# Patient Record
Sex: FEMALE | Race: BLACK OR AFRICAN AMERICAN | URBAN - METROPOLITAN AREA
[De-identification: names, ages, dates, MRNs, and addresses within clinical notes are randomized per-mention and may not be internally consistent; named-entity substitution may affect disease eponyms.]

---

## 2019-05-13 NOTE — PROGRESS NOTES
[address], [address] [place], [address] [place] [address] 
[address] [place] [address], [address] [place], [address] 
[phone] Elva Dues DO Patient ID: 
Name:  Jeremias Olvera MRN:  [id number] :  1955/63 y.o. Date:  2019 HISTORY OF PRESENT ILLNESS: 
Jeremias Olvera is a 61 y.o.   postmenopausal female referred by Dr. Radha Ospina for endometrial hyperplasia with atypia. Her endometrial biopsy from 2018 results were benign endocervical glands, mucus and inflammation. She has a history of  x 1 year with two insufficient office biopsies in 2019. She underwent a hysteroscopy, D/C and myosure for  bleeding on 2019 with pathology results of atypical glandula proliferation, suspicious for neoplasm. Her previous GYN surgical history positive for LSO; csection x 3. She is here today for evaluation. Still having some spotting. Labs: 
2019  
1) Uterus Endometrium curetting: 
Multiple fragments of smooth muscle and inactive endometrium with morphologic features suggestive of polyp. 2)Uterus, Endometrium, Curettings: 
Atypical glandula proliferation, suspicious for neoplasm. 2018 ENDOMETRIUM BIOPSY: 
 Fragments of benign endocervical glands, mucus and inflammation. Imaging 
none ROS:  
As above Patient Active Problem List  
 Diagnosis Date Noted  Obesity, morbid ([address] 2019 Past Medical History:  
Diagnosis Date  Diabetes ([address]  Edema  GERD (gastroesophageal reflux disease)  Hyperlipidemia  Hypothyroidism Past Surgical History:  
Procedure Laterality Date  DILATION AND CURETTAGE N/A 2019  HX  SECTION    
 X3  
 HX CHOLECYSTECTOMY  HX KNEE REPLACEMENT Bilateral   
 HX OOPHORECTOMY Left  HX OTHER SURGICAL Right   
 surgery to repair fractured arm  HX OVARIAN CYST REMOVAL    
 HX TUBAL LIGATION    
  HYSTEROSCOPY,W/ENDO BX N/A 2019 OB History   
4 Para 3 Term 3  AB 1 Living 3 SAB  
1  
 TAB Ectopic Molar Multiple Live Births  
0 Social History Tobacco Use  Smoking status: Never Smoker  Smokeless tobacco: Never Used Substance Use Topics  Alcohol use: Yes Comment: \"Occassional\" Family History Problem Relation Age of Onset  Diabetes Mother  Hypertension Mother Vazquez Other Mother Aneurysm  Hypertension Father  Other Paternal Grandmother Fibrocystic breast disease  Ovarian Cancer Paternal Grandmother Current Outpatient Medications Medication Sig  VICTOZA 3-RUSSELL 0.6 mg/0.1 mL (18 mg/3 mL) pnij INJECT 1.8 MG DAILY  levothyroxine (SYNTHROID) 150 mcg tablet TAKE 1 TABLET BY MOUTH DAILY  triamterene-hydroCHLOROthiazide (MAXZIDE) 75-50 mg per tablet TAKE 1 TABLET BY MOUTH EVERY DAY  LIVALO 2 mg tablet TAKE 1 TABLET BY MOUTH EVERY DAY  omeprazole (PRILOSEC) 40 mg capsule TAKE ONE CAPSULE BY MOUTH EVERY DAY  ibuprofen (MOTRIN) 800 mg tablet TAKE 1 TABLET (800 MG TOTAL) BY MOUTH EVERY 8 (EIGHT) HOURS AS NEEDED FOR MILD PAIN (PSR 1-3) (PAIN)  multivitamin with minerals (HAIR,SKIN AND NAILS PO) Take  by mouth. No current facility-administered medications for this visit. Allergies Allergen Reactions  Cephalexin Hives  Erythromycin Hives  Loratadine-Pseudoephedrine Itching  Sulfa (Sulfonamide Antibiotics) Hives OBJECTIVE: 
 
Physical Exam 
VITAL SIGNS: Visit Vitals /55 (BP 1 Location: Right arm, BP Patient Position: Sitting) Pulse 68 Temp 96.7 °F (35.9 °C) (Oral) Resp 16 Ht 5' 0.25\" (1.53 m) Wt 112.9 kg (249 lb) LMP  (LMP Unknown) SpO2 97% BMI 48.23 kg/m² GENERAL LAVELL: in no apparent distress and well developed and well nourished MUSCULOSKEL: no joint tenderness, deformity or swelling INTEGUMENT:  warm and dry, no rashes or lesions ABDOMEN . soft, NT, ND, No masses appreciated EXTREMITIES: extremities normal, atraumatic, no cyanosis or edema PELVIC: Vulva and vagina appear normal. Bimanual exam reveals normal uterus and adnexa. RECTAL: deferred KENROY SURVEY: Cervical, supraclavicular, axillary and inguinal nodes normal.  
NEURO: Grossly normal  
 
 
 
IMPRESSION/PLAN: 
1. Endometrial hyperplasia 
 -reviewed her pathology and discussed recommendation for hysterectomy, RSO witn intraop pathology and possible staging 
 -discussed robotic approach 
 -Risks, benefits and alternatives of surgery discussed in detail. Risks including bleeding, infection, blood clot, injury to nearby organs including bladder, bowel, ureters and blood vessels.  
 -surgery to be scheduled at [facility] on 5/23/2019 The total time spent was 60 minutes regarding this patients diagnosis of endometrial hyperplasia and >50% of this time was spent counseling and coordinating care Johana Horowitz DO Gynecologic Oncology 5/15/48485:67 PM

## 2019-05-14 ENCOUNTER — OFFICE VISIT (OUTPATIENT)
Dept: ONCOLOGY | Age: 64
End: 2019-05-14

## 2019-05-14 ENCOUNTER — TELEPHONE (OUTPATIENT)
Dept: ONCOLOGY | Age: 64
End: 2019-05-14

## 2019-05-14 VITALS
OXYGEN SATURATION: 97 % | SYSTOLIC BLOOD PRESSURE: 115 MMHG | TEMPERATURE: 96.7 F | HEIGHT: 60 IN | HEART RATE: 68 BPM | WEIGHT: 249 LBS | RESPIRATION RATE: 16 BRPM | DIASTOLIC BLOOD PRESSURE: 55 MMHG | BODY MASS INDEX: 48.88 KG/M2

## 2019-05-14 DIAGNOSIS — N85.00 ENDOMETRIAL HYPERPLASIA: ICD-10-CM

## 2019-05-14 DIAGNOSIS — Z01.818 PREOPERATIVE TESTING: Primary | ICD-10-CM

## 2019-05-14 DIAGNOSIS — E66.01 OBESITY, MORBID (HCC): ICD-10-CM

## 2019-05-14 DIAGNOSIS — Z01.818 PREOPERATIVE TESTING: ICD-10-CM

## 2019-05-14 RX ORDER — TRIAMTERENE AND HYDROCHLOROTHIAZIDE 75; 50 MG/1; MG/1
TABLET ORAL
Refills: 1 | COMMUNITY
Start: 2019-04-16

## 2019-05-14 RX ORDER — PITAVASTATIN CALCIUM 2.09 MG/1
TABLET, FILM COATED ORAL
Refills: 1 | COMMUNITY
Start: 2019-04-16

## 2019-05-14 RX ORDER — LIRAGLUTIDE 6 MG/ML
INJECTION SUBCUTANEOUS
Refills: 3 | COMMUNITY
Start: 2019-04-20

## 2019-05-14 RX ORDER — IBUPROFEN 800 MG/1
TABLET ORAL
Refills: 0 | COMMUNITY
Start: 2019-04-30 | End: 2019-06-10 | Stop reason: DRUGHIGH

## 2019-05-14 RX ORDER — LEVOTHYROXINE SODIUM 150 UG/1
TABLET ORAL
Refills: 1 | COMMUNITY
Start: 2019-04-17

## 2019-05-14 RX ORDER — OMEPRAZOLE 40 MG/1
CAPSULE, DELAYED RELEASE ORAL
Refills: 5 | COMMUNITY
Start: 2019-04-12

## 2019-05-14 NOTE — LETTER
5/14/19 Patient: Didier Richard YOB: 1955 Date of Visit: 5/14/2019 Ryan Stock [facility] A [address] [facility] [address] VIA Facsimile: [phone] Dear Ryan Stock MD, Thank you for referring Ms. Didier Richard to [facility] for evaluation. My notes for this consultation are attached. If you have questions, please do not hesitate to call me. I look forward to following your patient along with you. Sincerely, Munir Irene MD

## 2019-05-14 NOTE — PATIENT INSTRUCTIONS

## 2019-05-14 NOTE — PROGRESS NOTES
Mechelle Falcon, a 61 y.o. female,  is here for Chief Complaint Patient presents with  New Patient  
  referred by Dr. Shivani Coyle for endometrial hyperplasia Visit Vitals /55 (BP 1 Location: Right arm, BP Patient Position: Sitting) Pulse 68 Temp 96.7 °F (35.9 °C) (Oral) Resp 16 Ht 5' 0.25\" (1.53 m) Wt 112.9 kg (249 lb) SpO2 97% BMI 48.23 kg/m² Patient reports intermittent abdominal pain, \"I'm not sure if it's related to what's going on or just general pain. \" She is also experiencing vaginal spotting from the dilation and curettage that was performed on 5/2/2019. Denies experiencing any constipation or diarrhea. No burning, discomfort, or irritation with urination.

## 2019-05-14 NOTE — TELEPHONE ENCOUNTER
Patient had a few questions and concerns regarding surgery clearance. Patient stated she can be contacted on her cell.

## 2019-05-15 NOTE — TELEPHONE ENCOUNTER
Spoke with patient in regards to her preoperative testing. She stated that she had bloodwork and an EKG completed recently at [address] [place] [address] and wondered if we could use these results. I explained that since I don't have access to the official record, she would still have to have bloodwork and the EKG completed at [facility]. Patient was satisfied with this and had no further questions or concerns, encouraged to call back if she develops any.

## 2019-05-15 NOTE — PROGRESS NOTES
Reviewed consent form for robotic-assisted hysterectomy, RSO with intraop pathology and possible staging and information packet scheduled for 5/23/19 at [place] [address] with an arrival time of 1130. Patient was given information packet that included pre-op and post-op instructions as well as the scheduled date, start time, arrival time, and length of the surgery and signed the consent form. Patient expressed understanding and had no further questions. Advised to call with any questions or concerns.        Shahnaz ARANGO-BC

## 2019-05-16 ENCOUNTER — OFFICE VISIT (OUTPATIENT)
Dept: ONCOLOGY | Age: 64
End: 2019-05-16

## 2019-05-16 DIAGNOSIS — Z71.89 ENCOUNTER FOR SURGICAL COUNSELING: Primary | ICD-10-CM

## 2019-05-16 LAB
ALBUMIN SERPL-MCNC: 4.2 G/DL (ref 3.6–4.8)
ALBUMIN/GLOB SERPL: 1.4 {RATIO} (ref 1.2–2.2)
ALP SERPL-CCNC: 82 IU/L (ref 39–117)
ALT SERPL-CCNC: 31 IU/L (ref 0–32)
AST SERPL-CCNC: 23 IU/L (ref 0–40)
BILIRUB SERPL-MCNC: 0.3 MG/DL (ref 0–1.2)
BUN SERPL-MCNC: 19 MG/DL (ref 8–27)
BUN/CREAT SERPL: 28 (ref 12–28)
CALCIUM SERPL-MCNC: 9.6 MG/DL (ref 8.7–10.3)
CHLORIDE SERPL-SCNC: 105 MMOL/L (ref 96–106)
CO2 SERPL-SCNC: 26 MMOL/L (ref 20–29)
CREAT SERPL-MCNC: 0.69 MG/DL (ref 0.57–1)
EST. AVERAGE GLUCOSE BLD GHB EST-MCNC: 114 MG/DL
GLOBULIN SER CALC-MCNC: 3 G/DL (ref 1.5–4.5)
GLUCOSE SERPL-MCNC: 116 MG/DL (ref 65–99)
HBA1C MFR BLD: 5.6 % (ref 4.8–5.6)
POTASSIUM SERPL-SCNC: 4.7 MMOL/L (ref 3.5–5.2)
PROT SERPL-MCNC: 7.2 G/DL (ref 6–8.5)
SODIUM SERPL-SCNC: 145 MMOL/L (ref 134–144)
SPECIMEN STATUS REPORT, ROLRST: NORMAL

## 2019-06-04 ENCOUNTER — OFFICE VISIT (OUTPATIENT)
Dept: ONCOLOGY | Age: 64
End: 2019-06-04

## 2019-06-04 VITALS
HEART RATE: 75 BPM | RESPIRATION RATE: 16 BRPM | SYSTOLIC BLOOD PRESSURE: 138 MMHG | OXYGEN SATURATION: 100 % | DIASTOLIC BLOOD PRESSURE: 58 MMHG | TEMPERATURE: 96.1 F | WEIGHT: 246.6 LBS | BODY MASS INDEX: 48.42 KG/M2 | HEIGHT: 60 IN

## 2019-06-04 DIAGNOSIS — N85.00 ENDOMETRIAL HYPERPLASIA: Primary | ICD-10-CM

## 2019-06-04 NOTE — PROGRESS NOTES
Chaz Jaquez, a 59 y.o. female,  is here for   Chief Complaint   Patient presents with    Surgical Follow-up     2 weeks post op TLH, right salpingo-oophorectomy, and lysis of adhesions performed on 5/23/2019 @ [facility]       Visit Vitals  /58 (BP 1 Location: Left arm, BP Patient Position: Sitting)   Pulse 75   Temp 96.1 °F (35.6 °C) (Oral)   Resp 16   Ht 5' 0.25\" (1.53 m)   Wt 111.9 kg (246 lb 9.6 oz)   SpO2 100%   BMI 47.76 kg/m²       Patient reports intermittent small amount of vaginal spotting. Pain is being well controlled with 800 mg ibuprofen. She was educated about returning the Percocet prescription at her 4 week post-op, patient agreeable. Denies experiencing any constipation or diarrhea. No burning, discomfort, or irritation with urination. 1. Have you been to the ER, urgent care clinic since your last visit? Hospitalized since your last visit? No    2. Have you seen or consulted any other health care providers outside of the [address] [place] [address] since your last visit? Include any pap smears or colon screening.  No

## 2019-06-04 NOTE — LETTER
6/4/19 Patient: Mikhail Pickett YOB: 1955 Date of Visit: 6/4/2019 Maria M Lopez [facility] A [address] [place] [address] VIA Facsimile: [phone] Dear Maria M Lopez MD, Thank you for referring Ms. Mikhail Pickett [facility]. My notes for this consultation are attached. If you have questions, please do not hesitate to call me. I look forward to following your patient along with you. Sincerely, Yovanny Platt MD

## 2019-06-04 NOTE — PROGRESS NOTES
[address] [facility]  [address] [place] [address], [address], [address] [facility]  [address] [facility], [address] [place] [address]  [place], [address]   [phone]  [facility]      Postoperative Office Note  Patient ID:  Name: Yvonne Madison  MRM: [id number]  : 1955/64 y.o. Date: 2019    SUBJECTIVE:    This is a 59 y.o.  female who presents s/p Robotic TLH/RSO on 2019  Currently she has no problems with eating, bowel movements, voiding, or their wound  Appetite is good. Eating a regular diet without difficulty. Urinating without difficulty. Bowel movements are regular. The patient is not having any pain. .has had some spotting. Had some trouble with gas pains but that resolved    Her pathology revealed   UTERUS AND CERVIX, RIGHT FALLOPIAN TUBE AND RIGHT OVARY; TOTAL HYSTERECTOMY   AND RIGHT SALPINGO-OOPHORECTOMY:  - ENDOMETRIUM: ATYPICAL HYPERPLASIA/ENDOMETRIOID INTRAEPITHELIAL NEOPLASIA.  - SUBMUCOSAL LEIOMYOMA, 2.6 CM IN GREATEST DIMENSION. - CERVIX: NO EVIDENCE OF DYSPLASIA. - RIGHT OVARY: BENIGN ATROPHIC OVARY. - FALLOPIAN TUBE: UNREMARKABLE FIMBRIATED FALLOPIAN TUBE WITH PREVIOUS LIGATION.  - NO EVIDENCE OF INVASIVE CARCINOMA. Medications:     Current Outpatient Medications on File Prior to Visit   Medication Sig Dispense Refill    VICTOZA 3-RUSSELL 0.6 mg/0.1 mL (18 mg/3 mL) pnij INJECT 1.8 MG DAILY  3    levothyroxine (SYNTHROID) 150 mcg tablet TAKE 1 TABLET BY MOUTH DAILY  1    triamterene-hydroCHLOROthiazide (MAXZIDE) 75-50 mg per tablet TAKE 1 TABLET BY MOUTH EVERY DAY  1    LIVALO 2 mg tablet TAKE 1 TABLET BY MOUTH EVERY DAY  1    omeprazole (PRILOSEC) 40 mg capsule TAKE ONE CAPSULE BY MOUTH EVERY DAY  5    ibuprofen (MOTRIN) 800 mg tablet TAKE 1 TABLET (800 MG TOTAL) BY MOUTH EVERY 8 (EIGHT) HOURS AS NEEDED FOR MILD PAIN (PSR 1-3) (PAIN)  0    multivitamin with minerals (HAIR,SKIN AND NAILS PO) Take  by mouth. No current facility-administered medications on file prior to visit. Allergies: Allergies   Allergen Reactions    Cephalexin Hives    Erythromycin Hives    Loratadine-Pseudoephedrine Itching    Sulfa (Sulfonamide Antibiotics) Hives       OBJECTIVE:    Vitals:   Visit Vitals  /58 (BP 1 Location: Left arm, BP Patient Position: Sitting)   Pulse 75   Temp 96.1 °F (35.6 °C) (Oral)   Resp 16   Ht 5' 0.25\" (1.53 m)   Wt 111.9 kg (246 lb 9.6 oz)   LMP  (LMP Unknown)   SpO2 100%   BMI 47.76 kg/m²       Physical Examination:    General:  alert, cooperative, no distress   Abdomen: soft, bowel sounds active, non-tender   Incision: healing well   Pelvic: deferred   Rectal: not done   Extremity:   extremities normal, atraumatic, no cyanosis or edema     IMPRESSION/PLAN:    Fiona Bronson is Doing well postoperatively. .  She has a working diagnosis of Endometrial hyperplasia. The operative procedures and clinical results have been reviewed with the patient. Implications of diagnosis discussed at length. All questions answered. I will see the patient back in 2 week(s). The patient is advised to call our office with any problems or concerns.     Abilio Macdonald DO  Gynecologic Oncology  6/4/2019/1:27 PM

## 2019-06-04 NOTE — PATIENT INSTRUCTIONS
Learning About Endometrial Hyperplasia  What is endometrial hyperplasia? Endometrial hyperplasia means that the lining (endometrium) of the uterus becomes too thick. It usually causes abnormal uterine bleeding. It's more common in women who are close to or in menopause. The uterus is where a fetus grows during pregnancy. It's a pear-shaped organ in a woman's lower belly. Abnormal bleeding includes:  · Bleeding more than usual.  · Bleeding more often. · Bleeding that doesn't occur at your regular time. · Any bleeding after menopause. This condition can lead to cancer in some cases. Your doctor will keep an eye on the condition to see if the cells keep changing. He or she may suggest treatments such as surgery or hormones. What causes it? Hormones that are out of balance can cause problems with the lining of the uterus. If you have too much of the hormone estrogen compared to progesterone, the lining can start to thicken. This can affect your menstrual cycle. (A regular cycle usually helps keep the lining of the uterus thin.)  Your risk for this condition may be higher if you have:  · Polycystic ovary syndrome. · Obesity, diabetes, or late menopause. · Not given birth to any children. · Tamoxifen treatment for breast cancer. How is it diagnosed? If you have abnormal vaginal bleeding, your doctor may do some tests to check the lining of your uterus. You may have a transvaginal, or pelvic, ultrasound. This test uses sound waves to make a picture of the uterus. It can measure the thickness of the lining. You will also have a biopsy. Your doctor will take a small sample of the lining of the uterus and look at it under a microscope. The doctor will look for certain changes in the cells. How is it treated? Treatment for endometrial hyperplasia may include:  · Watching for changes in the lining of the uterus over time. · Taking the hormone progestin.   · Having surgery to remove the uterus (hysterectomy). In some cases, surgery to remove the ovaries and fallopian tubes may also be done. The treatment depends on whether the cells in the uterine lining have changed. It also depends on whether you want to have children in the future. Follow-up care is a key part of your treatment and safety. Be sure to make and go to all appointments, and call your doctor if you are having problems. It's also a good idea to know your test results and keep a list of the medicines you take. Where can you learn more? Go to http://hortencia-halley.info/. Enter Y647 in the search box to learn more about \"Learning About Endometrial Hyperplasia. \"  Current as of: May 14, 2018  Content Version: 11.9  © 6957-6662 [employer]. Care instructions adapted under license by [employer] (which disclaims liability or warranty for this information). If you have questions about a medical condition or this instruction, always ask your healthcare professional. [address] any warranty or liability for your use of this information.

## 2019-06-10 ENCOUNTER — TELEPHONE (OUTPATIENT)
Dept: ONCOLOGY | Age: 64
End: 2019-06-10

## 2019-06-10 RX ORDER — IBUPROFEN 800 MG/1
800 TABLET ORAL
Qty: 60 TAB | Refills: 1 | Status: SHIPPED | OUTPATIENT
Start: 2019-06-10

## 2019-06-10 NOTE — TELEPHONE ENCOUNTER
Patient contacted the office requesting a prescription for Motrin 800 mg be sent to the [facility] [address] in her chart. She states that she has Percocet but has not used it and will bring it to her appointment. Please call her back at [phone] with any questions.

## 2019-06-10 NOTE — TELEPHONE ENCOUNTER
Returned call to patient, reports intermittent discomfort/pain, not to the degree of needed narcotic pain relief. Will send Motrin rx and reviewed dosing recommendations. Patient denies symptoms otherwise. Verified f/u appt, advised to call sooner PRN. Patient agreeable.

## 2019-06-18 ENCOUNTER — OFFICE VISIT (OUTPATIENT)
Dept: ONCOLOGY | Age: 64
End: 2019-06-18

## 2019-06-18 VITALS
BODY MASS INDEX: 48.77 KG/M2 | RESPIRATION RATE: 16 BRPM | TEMPERATURE: 96.4 F | HEART RATE: 71 BPM | DIASTOLIC BLOOD PRESSURE: 57 MMHG | HEIGHT: 60 IN | OXYGEN SATURATION: 99 % | SYSTOLIC BLOOD PRESSURE: 130 MMHG | WEIGHT: 248.4 LBS

## 2019-06-18 DIAGNOSIS — N85.00 ENDOMETRIAL HYPERPLASIA: Primary | ICD-10-CM

## 2019-06-18 NOTE — PROGRESS NOTES
Yvonne Madison, a 59 y.o. female,  is here for   Chief Complaint   Patient presents with    Surgical Follow-up     4 week post op TLH/BSO performed on 5/23/2019       Visit Vitals  /57 (BP 1 Location: Right arm, BP Patient Position: Sitting)   Pulse 71   Temp 96.4 °F (35.8 °C) (Oral)   Resp 16   Ht 5' 0.25\" (1.53 m)   Wt 112.7 kg (248 lb 6.4 oz)   SpO2 99%   BMI 48.11 kg/m²       Patient reports intermittent vaginal spotting, \"just every once in awhile. \"  Patient denies any persistent or worsening abdominal or pelvic pain. Denies experiencing any constipation or diarrhea. No burning, discomfort, or irritation with urination. 1. Have you been to the ER, urgent care clinic since your last visit? Hospitalized since your last visit? No    2. Have you seen or consulted any other health care providers outside of the [address] [place] [address] since your last visit? Include any pap smears or colon screening.  No

## 2019-06-18 NOTE — PATIENT INSTRUCTIONS

## 2019-06-18 NOTE — PROGRESS NOTES
[address], [address], [address] [facility]  [address] [facility], [address] [place] [address]  [facility], [address] [place], [place] [address]in [address]   [phone]  [facility] DO      Postoperative Office Note  Patient ID:  Name: Vero Rain  MRM: [id number]  : 1955/64 y.o. Date: 2019    SUBJECTIVE:    This is a 59 y.o.  female who presents s/p Robotic TLH/RSO on 2019  Currently she has no problems with eating, bowel movements, voiding, or their wound  Appetite is good. Eating a regular diet without difficulty. Urinating without difficulty. Bowel movements are regular. The patient is not having any pain. .has had some spotting. Her pathology revealed   UTERUS AND CERVIX, RIGHT FALLOPIAN TUBE AND RIGHT OVARY; TOTAL HYSTERECTOMY   AND RIGHT SALPINGO-OOPHORECTOMY:  - ENDOMETRIUM: ATYPICAL HYPERPLASIA/ENDOMETRIOID INTRAEPITHELIAL NEOPLASIA.  - SUBMUCOSAL LEIOMYOMA, 2.6 CM IN GREATEST DIMENSION. - CERVIX: NO EVIDENCE OF DYSPLASIA. - RIGHT OVARY: BENIGN ATROPHIC OVARY. - FALLOPIAN TUBE: UNREMARKABLE FIMBRIATED FALLOPIAN TUBE WITH PREVIOUS LIGATION.  - NO EVIDENCE OF INVASIVE CARCINOMA. Medications:     Current Outpatient Medications on File Prior to Visit   Medication Sig Dispense Refill    ibuprofen (MOTRIN) 800 mg tablet Take 1 Tab by mouth every six (6) hours as needed for Pain. 60 Tab 1    VICTOZA 3-RUSSELL 0.6 mg/0.1 mL (18 mg/3 mL) pnij INJECT 1.8 MG DAILY  3    levothyroxine (SYNTHROID) 150 mcg tablet TAKE 1 TABLET BY MOUTH DAILY  1    triamterene-hydroCHLOROthiazide (MAXZIDE) 75-50 mg per tablet TAKE 1 TABLET BY MOUTH EVERY DAY  1    LIVALO 2 mg tablet TAKE 1 TABLET BY MOUTH EVERY DAY  1    omeprazole (PRILOSEC) 40 mg capsule TAKE ONE CAPSULE BY MOUTH EVERY DAY  5    multivitamin with minerals (HAIR,SKIN AND NAILS PO) Take  by mouth. No current facility-administered medications on file prior to visit. Allergies: Allergies   Allergen Reactions    Cephalexin Hives    Erythromycin Hives    Loratadine-Pseudoephedrine Itching    Sulfa (Sulfonamide Antibiotics) Hives       OBJECTIVE:    Vitals:   Visit Vitals  LMP  (LMP Unknown)       Physical Examination:    General:  alert, cooperative, no distress   Abdomen: soft, bowel sounds active, non-tender   Incision: healing well   Pelvic: NEFG, spec exam revealed vaginal cuff intact. BME revealed cuff intact, NT   Rectal: not done   Extremity:   extremities normal, atraumatic, no cyanosis or edema     IMPRESSION/PLAN:    Madeleine Tran is Doing well postoperatively. .  She has a working diagnosis of Endometrial hyperplasia. The operative procedures and clinical results had been previously reviewed with the patient. Pt will f/u with dr. Ubaldo Rivera for routine gyn health maintenance  I will see the patient back prn The patient is advised to call our office with any problems or concerns.     Shirley Lambert DO  Gynecologic Oncology  6/18/2019/1:27 PM

## 2019-06-18 NOTE — LETTER
6/18/19 Patient: Esteban Hoffman YOB: 1955 Date [facility]: [facility] [address] [facility] [address] VIA Facsimile: [phone] Dear Nils Schaffer MD, Thank you for referring Ms. Esteban Hoffman to [facility] for evaluation. My notes for this consultation are attached. If you have questions, please do not hesitate to call me. I look forward to following your patient along with you. Sincerely, Robert Daley MD

## 2019-08-20 DIAGNOSIS — N85.00 ENDOMETRIAL HYPERPLASIA: ICD-10-CM

## 2019-08-20 DIAGNOSIS — Z01.818 PREOPERATIVE TESTING: ICD-10-CM

## 2022-03-18 PROBLEM — E66.01 OBESITY, MORBID (HCC): Status: ACTIVE | Noted: 2019-05-14

## 2023-01-09 ENCOUNTER — OFFICE VISIT (OUTPATIENT)
Dept: HEMATOLOGY | Age: 68
End: 2023-01-09

## 2023-01-09 ENCOUNTER — HOSPITAL ENCOUNTER (OUTPATIENT)
Dept: LAB | Age: 68
Discharge: HOME | End: 2023-01-09
Payer: MEDICARE

## 2023-01-09 VITALS
HEIGHT: 60 IN | WEIGHT: 255 LBS | BODY MASS INDEX: 50.06 KG/M2 | HEART RATE: 61 BPM | TEMPERATURE: 97.1 F | OXYGEN SATURATION: 99 %

## 2023-01-09 DIAGNOSIS — R74.8 ELEVATED LIVER ENZYMES: Primary | ICD-10-CM

## 2023-01-09 DIAGNOSIS — R74.8 ELEVATED LIVER ENZYMES: ICD-10-CM

## 2023-01-09 DIAGNOSIS — R94.5 ABNORMAL RESULTS OF LIVER FUNCTION STUDIES: ICD-10-CM

## 2023-01-09 PROBLEM — Z98.890 H/O ELBOW SURGERY: Status: ACTIVE | Noted: 2023-01-09

## 2023-01-09 PROBLEM — K21.9 GERD (GASTROESOPHAGEAL REFLUX DISEASE): Status: ACTIVE | Noted: 2023-01-09

## 2023-01-09 PROBLEM — E11.9 TYPE II DIABETES MELLITUS (HCC): Status: ACTIVE | Noted: 2023-01-09

## 2023-01-09 PROBLEM — I10 HYPERTENSION: Status: ACTIVE | Noted: 2023-01-09

## 2023-01-09 PROBLEM — Z90.710 S/P TAH (TOTAL ABDOMINAL HYSTERECTOMY): Status: ACTIVE | Noted: 2023-01-09

## 2023-01-09 PROBLEM — Z96.653 HISTORY OF TOTAL BILATERAL KNEE REPLACEMENT: Status: ACTIVE | Noted: 2023-01-09

## 2023-01-09 PROBLEM — Z90.49 HISTORY OF CHOLECYSTECTOMY: Status: ACTIVE | Noted: 2023-01-09

## 2023-01-09 PROBLEM — E78.00 HYPERCHOLESTEROLEMIA: Status: ACTIVE | Noted: 2023-01-09

## 2023-01-09 LAB
ALBUMIN SERPL-MCNC: 3.4 G/DL (ref 3.4–5)
ALBUMIN/GLOB SERPL: 0.9 (ref 0.8–1.7)
ALP SERPL-CCNC: 90 U/L (ref 45–117)
ALT SERPL-CCNC: 39 U/L (ref 13–56)
ANION GAP SERPL CALC-SCNC: 6 MMOL/L (ref 3–18)
AST SERPL-CCNC: 39 U/L (ref 10–38)
BASOPHILS # BLD: 0 K/UL (ref 0–0.1)
BASOPHILS NFR BLD: 1 % (ref 0–2)
BILIRUB DIRECT SERPL-MCNC: 0.2 MG/DL (ref 0–0.2)
BILIRUB SERPL-MCNC: 0.6 MG/DL (ref 0.2–1)
BUN SERPL-MCNC: 19 MG/DL (ref 7–18)
BUN/CREAT SERPL: 21 (ref 12–20)
CALCIUM SERPL-MCNC: 8.9 MG/DL (ref 8.5–10.1)
CHLORIDE SERPL-SCNC: 107 MMOL/L (ref 100–111)
CO2 SERPL-SCNC: 27 MMOL/L (ref 21–32)
CREAT SERPL-MCNC: 0.9 MG/DL (ref 0.6–1.3)
DIFFERENTIAL METHOD BLD: ABNORMAL
EOSINOPHIL # BLD: 0.1 K/UL (ref 0–0.4)
EOSINOPHIL NFR BLD: 2 % (ref 0–5)
ERYTHROCYTE [DISTWIDTH] IN BLOOD BY AUTOMATED COUNT: 13.4 % (ref 11.6–14.5)
GLOBULIN SER CALC-MCNC: 3.8 G/DL (ref 2–4)
GLUCOSE SERPL-MCNC: 120 MG/DL (ref 74–99)
HCT VFR BLD AUTO: 41 % (ref 35–45)
HGB BLD-MCNC: 13.9 G/DL (ref 12–16)
IMM GRANULOCYTES # BLD AUTO: 0 K/UL (ref 0–0.04)
IMM GRANULOCYTES NFR BLD AUTO: 0 % (ref 0–0.5)
INR PPP: 1 (ref 0.8–1.2)
LYMPHOCYTES # BLD: 2.2 K/UL (ref 0.9–3.6)
LYMPHOCYTES NFR BLD: 38 % (ref 21–52)
MCH RBC QN AUTO: 31.4 PG (ref 24–34)
MCHC RBC AUTO-ENTMCNC: 33.9 G/DL (ref 31–37)
MCV RBC AUTO: 92.8 FL (ref 78–100)
MONOCYTES # BLD: 0.4 K/UL (ref 0.05–1.2)
MONOCYTES NFR BLD: 7 % (ref 3–10)
NEUTS SEG # BLD: 3 K/UL (ref 1.8–8)
NEUTS SEG NFR BLD: 52 % (ref 40–73)
NRBC # BLD: 0 K/UL (ref 0–0.01)
NRBC BLD-RTO: 0 PER 100 WBC
PLATELET # BLD AUTO: 109 K/UL (ref 135–420)
PMV BLD AUTO: 12.1 FL (ref 9.2–11.8)
POTASSIUM SERPL-SCNC: 3.6 MMOL/L (ref 3.5–5.5)
PROT SERPL-MCNC: 7.2 G/DL (ref 6.4–8.2)
PROTHROMBIN TIME: 14 SEC (ref 11.5–15.2)
RBC # BLD AUTO: 4.42 M/UL (ref 4.2–5.3)
SODIUM SERPL-SCNC: 140 MMOL/L (ref 136–145)
WBC # BLD AUTO: 5.7 K/UL (ref 4.6–13.2)

## 2023-01-09 PROCEDURE — 36415 COLL VENOUS BLD VENIPUNCTURE: CPT

## 2023-01-09 PROCEDURE — 85025 COMPLETE CBC W/AUTO DIFF WBC: CPT

## 2023-01-09 PROCEDURE — 80076 HEPATIC FUNCTION PANEL: CPT

## 2023-01-09 PROCEDURE — 85610 PROTHROMBIN TIME: CPT

## 2023-01-09 PROCEDURE — 80048 BASIC METABOLIC PNL TOTAL CA: CPT

## 2023-01-09 PROCEDURE — 82107 ALPHA-FETOPROTEIN L3: CPT

## 2023-01-09 PROCEDURE — 86708 HEPATITIS A ANTIBODY: CPT

## 2023-01-09 RX ORDER — TRIAMCINOLONE ACETONIDE 0.25 MG/G
OINTMENT TOPICAL 2 TIMES DAILY
COMMUNITY

## 2023-01-09 RX ORDER — PRAVASTATIN SODIUM 20 MG/1
20 TABLET ORAL DAILY
COMMUNITY
Start: 2022-11-20

## 2023-01-09 RX ORDER — MELOXICAM 15 MG/1
15 TABLET ORAL DAILY
COMMUNITY
Start: 2022-08-11 | End: 2023-08-11

## 2023-01-09 RX ORDER — CLOBETASOL PROPIONATE 0.5 MG/G
CREAM TOPICAL 2 TIMES DAILY
COMMUNITY

## 2023-01-09 NOTE — Clinical Note
1/22/2023    Patient: Dinora Tai   YOB: 1955   Date of Visit: 1/9/2023     Mir Mccullough, [address] [address]  Via Fax: [phone]    Dear Mir Mccullough MD,      Thank you for referring Ms. Dinora Tai to [address] [place] [address],[address] for evaluation. My notes for this consultation are attached. If you have questions, please do not hesitate to call me. I look forward to following your patient along with you.       Sincerely,    Hunter Kelly MD

## 2023-01-09 NOTE — PROGRESS NOTES
[address] [facility], MD, FACP, [facility] [place]      Junito Reynoso, PA-AMIRA Peñaloza, PCNP-BC   Kerrie Dobbs, [facility]-   Gaila Prader, FNP-C  Rachael Aguiar FNP-C   Gregory Klein, AGPCNP-BC      [address]   at [address], [address]   [phone]   FAX: [address]   at [facility]   [address], [address]   [facility], [address]   [phone]   FAX: [phone]       Patient Care Team:  Anastasiia Glez MD as PCP - General (Family Medicine)  Darcy Randolph MD (Obstetrics & Gynecology)  Dg Bee MD (Gynecologic Oncology)      Problem List  Date Reviewed: 1/9/2023            Codes Class Noted    Elevated liver enzymes ICD-10-CM: R74.8  ICD-9-CM: 790.5  1/9/2023        Type II diabetes mellitus ([facility] [address] ICD-10-CM: E11.9  ICD-9-CM: 250.00  1/9/2023        Hypercholesterolemia ICD-10-CM: E78.00  ICD-9-CM: 272.0  1/9/2023        GERD (gastroesophageal reflux disease) ICD-10-CM: K21.9  ICD-9-CM: 530.81  1/9/2023        Hypertension ICD-10-CM: I10  ICD-9-CM: 401.9  1/9/2023        S/P CLARA (total abdominal hysterectomy) ICD-10-CM: Z90.710  ICD-9-CM: V88.01  1/9/2023        History of total bilateral knee replacement ICD-10-CM: R01.728  ICD-9-CM: V43.65  1/9/2023        H/O elbow surgery ICD-10-CM: Z98.890  ICD-9-CM: V15.29  1/9/2023        History of cholecystectomy ICD-10-CM: Z90.49  ICD-9-CM: V45.79  1/9/2023        Obesity, morbid [facility]) ICD-10-CM: E66.01  ICD-9-CM: 278.01  5/14/2019           The clinicians listed above have asked me to see [facility] in consultation regarding elevated liver enzymes and its management. All medical records sent by the referring physicians were reviewed including imaging studies     The patient is a 79 y.o.  Black female who was found to have elevated liver transaminases in 2022. Serologic evaluation for markers of chronic liver disease was positive for SHARRI,     The most recent imaging of the liver was Ultrasound performed in 4/2022. Results suggest chronic liver disease with a nodular surface suggesting cirrhosis. Elastography mild  FS.  283, 19.3, 20%    The patient has the following symptoms which could be attributed to the liver disorder:    diffuse abdominal pain,   nausea,   Alternating constipation and diarrhea,     The patient is not experiencing the following symptoms which are commonly seen in this liver disorder:   fatigue,   vomiting,     The patient completes all daily activities without any functional limitations. ASSESSMENT AND PLAN:  Elevated liver enzymes  Persistent elevation in  Intermittent elevation in  liver transaminases   and   alkaline phosphatase   of unclear etiology at this time. Will perform   Have performed   laboratory testing to monitor liver function and degree of liver injury. This included   BMP,   hepatic panel,   CBC with platelet count,   INR. Liver transaminases are   normal.    elevated. AST is   normal.    elevated. ALT is   normal.    elevated. ALP is   normal.    elevated. Liver function is   normal.    depressed. Total bilirubin is   normal.    elevated. Serum albumin is   normal.    depressed. INR is   normal.    prolonged. The platelet count is   normal.    depressed. Based upon laboratory studies   Fibroscan,   Elastography,   and imaging    the patient   does not appear to have   appears to have   may have   significant liver injury. advanced liver disease. cirrhosis. Serologic testing for causes of chronic liver disease   was ordered.     was negative for     was positive for   HCV  HBV   SHARRI   ASMA  AMA  Alpha-1-antitrypsin  Ceruloplasmin  an elevation in   Ferritin  FE saturation  Will perform additional serologic tests to screen for other causes of chronic liver disease. The most likely causes for the liver chemistry abnormalities were discussed with the patient and include   viral hepatitis,   fatty liver disease,   alcoholic liver disease. immune liver disorders,   genetic metabolic liver disorders,   medications,   a non-specific non-hepatitis virus. The need to perform an assessment of liver fibrosis was discussed with the patient. The Fibroscan can assess liver fibrosis and determine if a patient has advanced fibrosis or cirrhosis without the need for liver biopsy. This will be performed at the next office visit. If the Fibroscan suggests advanced fibrosis then a liver biopsy should be considered. The Fibroscan can be repeated annually or as often as clinically indicated to assess for fibrosis progression and/or regression. If the   Since the  Fibroscan suggests there is none or minimal liver injury the patient will be monitored at 6 month intervals. If the liver enzymes remain normal and the liver stiffness by Fibroscan remains normal or near normal over the next 1-2 years than no further monitoring is needed. If the liver enzymes remain   intermittently elevated or become persistently elevated   persistently elevated   and/or the Fibroscan suggests that liver stiffness is increasing over the next 1-2 years than a liver biopsy should be considered. Will perform   Have performed   laboratory testing to monitor liver function and degree of liver injury. This included   BMP,   hepatic panel,   CBC with platelet count,   INR. Will perform imaging of the liver with   ultrasound. triple phase CT scan. dynamic MRI. MRI and MRCP because of persistent elevation in ALP and possible bile duct disease. The need to perform a liver biopsy to help determine the cause and severity of the liver test abnormalities was discussed.   The risks of performing the liver biopsy including pain, puncture of the lung, gallbladder, intestine or kidney and bleeding were discussed. The patient has decided to have a liver biopsy. This will be scheduled. Will defer liver biopsy for now. I have recommended that we proceed with liver biopsy but the patient has decided to defer this for now. There is no reason to perform liver biopsy at this time. Liver chemistries will be monitored. If the liver enzymes remain persistently elevated over the next 1-2 years a liver biopsy should be performed to ensure there is no ongoing chronic liver disease. The patient had a liver biopsy performed in ***. Will request that the liver biopsy slides sent be to me for my personal review. Screening for Hepatocellular Carcinoma  HCC screening   is not necessary if the patient has no evidence of cirrhosis. has not been not been performed   since ***/***.  was performed in ***/*** and   does not suggest [facility] [address] demonstrates   an elevation in AFP. a lesion on ultrasound. AFP was ordered today and ultrasound will be scheduled. Will repeat ultrasound in 6 months. Will perform   triple phase CT scan      dynamic MRI   to further characterize the lesion and help determine if this is HCC. Treatment of other medical problems in patients with chronic liver disease  There are no contraindications for the patient to take most medications that are necessary for treatment of other medical issues. The patient has cirrhrosis and should avoid taking NSAIDs which are associated with a higher rate of developing KATHRYN. The patient had HE and should avoid taking Benzodiazapines which could exacerbate HE. The patient can take   any medications utilized for treatment of DM  statins to treat hypercholesterolemia    The patient has alcohol induced liver disease but has been abstinent from alcohol for greater than 6 months.   Normal doses of acetaminophen, as recommended on the label of the bottle, are not hepatotoxic except in the setting of daily alcohol use, even in patients with cirrhosis and can be utilized for pain. The patient consumes alcohol on a daily basis or has recently stopped consuming alcohol. Regular alcohol use increases the risk of toxicity from acetaminophen. This analgesic should be avoided until the patient has been abstinent from alcohol for 6 months. Counseling for alcohol in patients with chronic liver disease  The patient was counseled regarding alcohol consumption and the effect of alcohol on chronic liver disease. The patient has cirrhosis and was advised to be abstinent from all alcohol including non-alcoholic beer which does contain some alcohol. The patient does not consume any significant amount of alcohol. The patient has not consumed alcohol since ***. The patient has continued to consume alcohol   daily. on rare social occasions. The patient was reminded that alcohol can cause fatty liver. Patients who have undergone obesity surgery are at much greater risk to develop alcohol induced liver injury. The patient does not have a chronic liver disease and does not have to be abstinent from alcohol. The patient consumes too much alcohol and is at risk to develop alcohol induced liver liver injury. It was recommended that all alcohol consumption be stopped and the patient be abstinent from alcohol for at least *** months. If the patient cannot stop consuming alcohol then there is an aclohol use disorder and the patient should consider entering alcohol counseling and/or attending AA. The patient has an alcohol abuse disorder and it was suggested that they enter alcohol counseling and/or attend AA. If the patient cannot stop drinking alcohol they cannot be considered a candidate for liver transplant. The patient will need to attend alcohol counceling prior to being accepted as a liver transplant candidate.     Vaccinations Vaccination for viral hepatitis A and B is recommended since the patient has no serologic evidence of previous exposure or vaccination with immunity. Vaccination for viral hepatitis A and B is not needed. The patient has serologic evidence of prior exposure or vaccination with immunity. Vaccination for viral hepatitis A and B has been initiated. Vaccination for viral hepatitis A and B has been completed. Serologic studies will be performed to assess response to vaccine. The need for vaccination against viral hepatitis A and B will be assessed with serologic and instituted as appropriate. Since the patient does not have a chronic liver disease which can lead to liver injury screening for HAV and HBV is not needed. The patient has   not   received   2 doses   and the booster dose   of COVID-19 vaccine. The patient should receive a booster dose of COVID-19 vaccine in ***/***. The patient had COVID-19 infection and recovered. The patient does not want to take COVID-19 vaccine. The patient was encouraged to take the COVID-19 vaccine. Routine vaccinations against other bacterial and viral agents can be performed as indicated. Annual flu vaccination should be administered if indicated. ALLERGIES  Allergies   Allergen Reactions    Cephalexin Hives    Erythromycin Hives    Loratadine-Pseudoephedrine Itching    Sulfa (Sulfonamide Antibiotics) Hives       MEDICATIONS  Current Outpatient Medications   Medication Sig    meloxicam (MOBIC) 15 mg tablet Take 15 mg by mouth daily. pravastatin (PRAVACHOL) 20 mg tablet Take 20 mg by mouth daily. clobetasoL (Temovate) 0.05 % topical cream Apply  to affected area two (2) times a day. triamcinolone acetonide (KENALOG) 0.025 % ointment Apply  to affected area two (2) times a day. use thin layer    VICTOZA 3-RUSSELL 0.6 mg/0.1 mL (18 mg/3 mL) pnij INJECT 1.8 MG DAILY    levothyroxine (SYNTHROID) 150 mcg tablet 88 mcg. triamterene-hydroCHLOROthiazide (MAXZIDE) 75-50 mg per tablet TAKE 1 TABLET BY MOUTH EVERY DAY     No current facility-administered medications for this visit. SYSTEM REVIEW NOT RELATED TO LIVER DISEASE OR REVIEWED ABOVE:  Constitution systems: Negative for fever, chills, weight gain, weight loss. Eyes: Negative for visual changes. ENT: Negative for sore throat, painful swallowing. Respiratory: Negative for cough, hemoptysis, SOB. Cardiology: Negative for chest pain, palpitations. GI:  Negative for constipation or diarrhea. : Negative for urinary frequency, dysuria, hematuria, nocturia. Skin: Negative for rash. Hematology: Negative for easy bruising, blood clots. Musculo-skelatal: Negative for back pain, muscle pain, weakness. Neurologic: Negative for headaches, dizziness, vertigo, memory problems not related to HE. Psychology: Negative for anxiety, depression. FAMILY HISTORY:  The father Has/had the following following chronic disease(s): None. The mother  of CVA. There is no family history of liver disease. SOCIAL HISTORY:  The patient is . The patient has 3 children, and 5 grandchildren. The patient has never used tobacco products. The patient has never consumed significant amounts of alcohol. The patient used to work as elementary and . The patient retired in 2017. PHYSICAL EXAMINATION:  Visit Vitals  Pulse 61   Temp 97.1 °F (36.2 °C)   Ht 5' 0.25\" (1.53 m)   Wt 255 lb (115.7 kg)   LMP  (LMP Unknown)   SpO2 99%   BMI 49.39 kg/m²     General: No acute distress. Eyes: Sclera anicteric. ENT: No oral lesions. Thyroid normal.  Nodes: No adenopathy. Skin: No spider angiomata. No jaundice. No palmar erythema. Respiratory: Lungs clear to auscultation. Cardiovascular: Regular heart rate. No murmurs. No JVD. Abdomen: Soft non-tender. Liver size normal to percussion/palpation. Spleen not palpable.  No obvious ascites. Extremities: No edema. No muscle wasting. No gross arthritic changes. Neurologic: Alert and oriented. Cranial nerves grossly intact. No asterixis. LABORATORY STUDIES:  Recent liver function panel, CBC with platelet count and BMP are not available. These studies will be performed. SEROLOGIES:  Not available or performed. Testing was performed today. LIVER HISTOLOGY:  Not available or performed    ENDOSCOPIC PROCEDURES:  Not available or performed    RADIOLOGY:  Not available or performed    OTHER TESTING:  Not available or performed    FOLLOW-UP:  All of the issues listed above in the Assessment and Plan were discussed with the patient. All questions were answered. The patient expressed a clear understanding of the above. [address] [id number] in ***   weeks   months   for Fibroscan   for elastography   2 weeks after liver biopsy. which should be 1-2 weeks after the next imaging study. and to initiate HCV treatment. to assess for the effects of diet changes and weight loss. to assess the effects and tolerability of ***.  for screening and enrollment into a clinical trial for treatment of ***. The patient was given a follow-up appointment for *** months in case she decides not to enter or is excluded from entering the clinical trial.  for routine monitoring. to review all data and determine the treatment plan.

## 2023-01-10 LAB — HAV AB SER QL IA: NEGATIVE

## 2023-01-11 LAB
AFP L3 MFR SERPL: 5.7 % (ref 0–9.9)
AFP SERPL-MCNC: 5.3 NG/ML (ref 0–9.2)

## 2023-01-25 ENCOUNTER — HOSPITAL ENCOUNTER (OUTPATIENT)
Dept: ULTRASOUND IMAGING | Age: 68
Discharge: HOME | End: 2023-01-25
Attending: INTERNAL MEDICINE
Payer: MEDICARE

## 2023-01-25 ENCOUNTER — HOSPITAL ENCOUNTER (OUTPATIENT)
Age: 68
Setting detail: OUTPATIENT SURGERY
Discharge: HOME | End: 2023-01-25
Attending: INTERNAL MEDICINE | Admitting: INTERNAL MEDICINE
Payer: MEDICARE

## 2023-01-25 VITALS
BODY MASS INDEX: 47.39 KG/M2 | DIASTOLIC BLOOD PRESSURE: 53 MMHG | HEART RATE: 72 BPM | TEMPERATURE: 97.7 F | WEIGHT: 251 LBS | SYSTOLIC BLOOD PRESSURE: 127 MMHG | HEIGHT: 61 IN | OXYGEN SATURATION: 99 % | RESPIRATION RATE: 16 BRPM

## 2023-01-25 DIAGNOSIS — R79.89 ELEVATED LFTS: ICD-10-CM

## 2023-01-25 PROCEDURE — 93005 ELECTROCARDIOGRAM TRACING: CPT

## 2023-01-25 RX ORDER — HYDROGEN PEROXIDE 3 %
20 SOLUTION, NON-ORAL MISCELLANEOUS DAILY
COMMUNITY
Start: 2022-12-08

## 2023-01-25 RX ORDER — ERGOCALCIFEROL 1.25 MG/1
1 CAPSULE ORAL
COMMUNITY
Start: 2022-12-15

## 2023-01-25 RX ORDER — CALCIUM CARBONATE/VITAMIN D3 500-10/5ML
1 LIQUID (ML) ORAL DAILY
COMMUNITY
Start: 2022-10-12

## 2023-01-25 RX ORDER — SODIUM CHLORIDE 9 MG/ML
125 INJECTION, SOLUTION INTRAVENOUS CONTINUOUS
Status: DISCONTINUED | OUTPATIENT
Start: 2023-01-25 | End: 2023-01-25 | Stop reason: HOSPADM

## 2023-01-25 NOTE — PERIOP NOTES
Patient bradycardiac, denies any shortness of breath, chest pain, dizziness or nausea.  Spoke with Dr. Varinder Mckeon, 12-lead EKG ordered

## 2023-01-25 NOTE — PERIOP NOTES
Called Dr. Rosette Matthews office to schedule an appointment of patient. Was directed to the NP Gene Rosado, gave her a report of the morning occurences including bradycardia, VS and instructions to patient and patient has copy of EKG. She stated she will assume responsibility and call the patient.

## 2023-01-25 NOTE — PERIOP NOTES
Dr. Shay Esparza in and spoke with patient.  Will cancel liver biopsy for today and patient to follow up with her cardiologist (Dr. Bladimir Champagne) and then will reschedule the liver biopsy

## 2023-01-25 NOTE — PERIOP NOTES
Discussed with patient that I will call Dr. Adria Rodas office and schedule her appointment so that we can discuss occurrence with them. Patient instructed to take it easy and to go to the ER if she has any chest pain, shortness of breath, or extreme dizziness. Patient and  verbalized understanding.

## 2023-01-26 LAB
ATRIAL RATE: 49 BPM
CALCULATED P AXIS, ECG09: 81 DEGREES
CALCULATED R AXIS, ECG10: -19 DEGREES
CALCULATED T AXIS, ECG11: 22 DEGREES
DIAGNOSIS, 93000: NORMAL
P-R INTERVAL, ECG05: 154 MS
Q-T INTERVAL, ECG07: 498 MS
QRS DURATION, ECG06: 94 MS
QTC CALCULATION (BEZET), ECG08: 449 MS
VENTRICULAR RATE, ECG03: 49 BPM

## 2023-04-22 DIAGNOSIS — R79.89 ELEVATED LFTS: Primary | ICD-10-CM

## 2024-01-24 ENCOUNTER — OFFICE VISIT (OUTPATIENT)
Age: 69
End: 2024-01-24
Payer: MEDICARE

## 2024-01-24 VITALS
SYSTOLIC BLOOD PRESSURE: 140 MMHG | TEMPERATURE: 97.3 F | BODY MASS INDEX: 42.86 KG/M2 | HEIGHT: 61 IN | WEIGHT: 227 LBS | HEART RATE: 66 BPM | DIASTOLIC BLOOD PRESSURE: 71 MMHG | OXYGEN SATURATION: 99 %

## 2024-01-24 DIAGNOSIS — K74.69 OTHER CIRRHOSIS OF LIVER (HCC): Primary | ICD-10-CM

## 2024-01-24 PROCEDURE — 1036F TOBACCO NON-USER: CPT | Performed by: INTERNAL MEDICINE

## 2024-01-24 PROCEDURE — 1090F PRES/ABSN URINE INCON ASSESS: CPT | Performed by: INTERNAL MEDICINE

## 2024-01-24 PROCEDURE — G8400 PT W/DXA NO RESULTS DOC: HCPCS | Performed by: INTERNAL MEDICINE

## 2024-01-24 PROCEDURE — 99214 OFFICE O/P EST MOD 30 MIN: CPT | Performed by: INTERNAL MEDICINE

## 2024-01-24 PROCEDURE — G8427 DOCREV CUR MEDS BY ELIG CLIN: HCPCS | Performed by: INTERNAL MEDICINE

## 2024-01-24 PROCEDURE — 1123F ACP DISCUSS/DSCN MKR DOCD: CPT | Performed by: INTERNAL MEDICINE

## 2024-01-24 PROCEDURE — G8484 FLU IMMUNIZE NO ADMIN: HCPCS | Performed by: INTERNAL MEDICINE

## 2024-01-24 PROCEDURE — 3017F COLORECTAL CA SCREEN DOC REV: CPT | Performed by: INTERNAL MEDICINE

## 2024-01-24 PROCEDURE — 3078F DIAST BP <80 MM HG: CPT | Performed by: INTERNAL MEDICINE

## 2024-01-24 PROCEDURE — 3077F SYST BP >= 140 MM HG: CPT | Performed by: INTERNAL MEDICINE

## 2024-01-24 PROCEDURE — G8417 CALC BMI ABV UP PARAM F/U: HCPCS | Performed by: INTERNAL MEDICINE

## 2024-01-24 RX ORDER — SEMAGLUTIDE 1.34 MG/ML
INJECTION, SOLUTION SUBCUTANEOUS
COMMUNITY
Start: 2023-06-15

## 2024-01-24 RX ORDER — BLOOD SUGAR DIAGNOSTIC
1 STRIP MISCELLANEOUS 2 TIMES DAILY
COMMUNITY
Start: 2022-07-07

## 2024-01-24 RX ORDER — POTASSIUM CHLORIDE 750 MG/1
10 TABLET, FILM COATED, EXTENDED RELEASE ORAL DAILY
COMMUNITY
Start: 2023-10-16

## 2024-01-24 RX ORDER — CLOBETASOL PROPIONATE 0.5 MG/G
CREAM TOPICAL
COMMUNITY
Start: 2020-10-12

## 2024-01-24 RX ORDER — CALCIUM CARBONATE/VITAMIN D3 500-10/5ML
1 LIQUID (ML) ORAL DAILY
COMMUNITY
Start: 2022-10-12

## 2024-01-24 RX ORDER — CHOLECALCIFEROL (VITAMIN D3) 125 MCG
CAPSULE ORAL
COMMUNITY
End: 2024-01-24

## 2024-01-24 RX ORDER — ATORVASTATIN CALCIUM 40 MG/1
40 TABLET, FILM COATED ORAL DAILY
COMMUNITY
Start: 2023-05-08 | End: 2024-05-07

## 2024-01-24 RX ORDER — OXYCODONE HYDROCHLORIDE AND ACETAMINOPHEN 5; 325 MG/1; MG/1
1-2 TABLET ORAL EVERY 4 HOURS PRN
COMMUNITY
Start: 2019-05-24 | End: 2024-01-24 | Stop reason: ALTCHOICE

## 2024-01-24 RX ORDER — PSEUDOEPHEDRINE HCL 30 MG
100 TABLET ORAL EVERY OTHER DAY
COMMUNITY

## 2024-01-24 RX ORDER — PRAVASTATIN SODIUM 20 MG
20 TABLET ORAL DAILY
COMMUNITY
Start: 2022-11-20

## 2024-01-24 RX ORDER — LEVOTHYROXINE SODIUM 0.1 MG/1
100 TABLET ORAL DAILY
COMMUNITY
Start: 2023-12-26

## 2024-01-24 RX ORDER — TRIAMTERENE CAPSULES 50 MG/1
50 CAPSULE ORAL 2 TIMES DAILY
COMMUNITY

## 2024-01-24 RX ORDER — TRIAMCINOLONE ACETONIDE 0.25 MG/G
OINTMENT TOPICAL
COMMUNITY
Start: 2020-10-15

## 2024-01-24 RX ORDER — ERGOCALCIFEROL 1.25 MG/1
1 CAPSULE ORAL WEEKLY
COMMUNITY
Start: 2022-12-15

## 2024-01-24 NOTE — PROGRESS NOTES
[facility]      Esdras Lemon MD, FACP, FACG, FAASLD      CINTIA Powell-JACQUELINE Eid, [facility]-BC   Kellen Ratshanna, [facility]   Divina Taylor, FNP-C  Yuriy Larry, FNP-C   Jen Hinkle, [facility]   at [facility]   [address], [address]   [place]  [address] [phone]   FAX: [phone]  [facility]   [address], [address]   [place]  [address] [phone]   FAX: [phone]         Patient Care Team:  Enoch Caldwell MD as PCP - General  Norris Lynn MD as Consulting Physician (Gynecological Oncology)  Eduardo Riley MD (Obstetrics & Gynecology)        Patient Active Problem List   Diagnosis    Obesity, morbid (HCC)    Elevated liver enzymes    Type II diabetes mellitus (HCC)    Hypercholesterolemia    GERD (gastroesophageal reflux disease)    Hypertension    S/P YUMIKO (total abdominal hysterectomy)    History of total bilateral knee replacement    H/O elbow surgery    History of cholecystectomy         Janet Chaves is being seen at [facility] for management of elevated liver enzymes.      The active problem list, all pertinent past medical history, medications, radiologic findings and laboratory findings related to the liver disorder were reviewed and discussed with the patient.      The patient is a 68 y.o. female who was found to have elevated liver transaminases in 2022.      Serologic evaluation for markers of chronic liver disease was positive for BERNARD,     The most recent imaging of the liver was Ultrasound performed in 11/2023.  Results suggest cirrhosis, no liver mass lesion, no dilated bile ducts, no ascites.      Fibroscan in 1/2023 was 19.3 kPa which correlates with cirrhosis.  The CAP score of 283 suggests hepatic steatosis.    Since the

## 2024-02-15 ENCOUNTER — HOSPITAL ENCOUNTER (OUTPATIENT)
Facility: HOSPITAL | Age: 69
Discharge: HOME | End: 2024-02-15
Attending: INTERNAL MEDICINE
Payer: MEDICARE

## 2024-02-15 DIAGNOSIS — K74.69 OTHER CIRRHOSIS OF LIVER (HCC): ICD-10-CM

## 2024-02-15 PROCEDURE — 76705 ECHO EXAM OF ABDOMEN: CPT

## 2024-02-22 ENCOUNTER — HOSPITAL ENCOUNTER (OUTPATIENT)
Facility: HOSPITAL | Age: 69
Setting detail: SPECIMEN
Discharge: HOME | End: 2024-02-22
Payer: MEDICARE

## 2024-02-22 ENCOUNTER — OFFICE VISIT (OUTPATIENT)
Age: 69
End: 2024-02-22

## 2024-02-22 VITALS
WEIGHT: 231 LBS | HEIGHT: 61 IN | DIASTOLIC BLOOD PRESSURE: 79 MMHG | SYSTOLIC BLOOD PRESSURE: 147 MMHG | OXYGEN SATURATION: 99 % | TEMPERATURE: 97.1 F | BODY MASS INDEX: 43.61 KG/M2 | HEART RATE: 71 BPM

## 2024-02-22 DIAGNOSIS — K74.69 OTHER CIRRHOSIS OF LIVER (HCC): ICD-10-CM

## 2024-02-22 DIAGNOSIS — R74.8 ELEVATED LIVER ENZYMES: ICD-10-CM

## 2024-02-22 DIAGNOSIS — R74.8 ELEVATED LIVER ENZYMES: Primary | ICD-10-CM

## 2024-02-22 LAB
ALBUMIN SERPL-MCNC: 3.1 G/DL (ref 3.4–5)
ALBUMIN/GLOB SERPL: 0.7 (ref 0.8–1.7)
ALP SERPL-CCNC: 180 U/L (ref 45–117)
ALT SERPL-CCNC: 98 U/L (ref 13–56)
ANION GAP SERPL CALC-SCNC: 4 MMOL/L (ref 3–18)
AST SERPL-CCNC: 111 U/L (ref 10–38)
BASOPHILS # BLD: 0 K/UL (ref 0–0.1)
BASOPHILS NFR BLD: 0 % (ref 0–2)
BILIRUB DIRECT SERPL-MCNC: 0.5 MG/DL (ref 0–0.2)
BILIRUB SERPL-MCNC: 1.2 MG/DL (ref 0.2–1)
BUN SERPL-MCNC: 13 MG/DL (ref 7–18)
BUN/CREAT SERPL: 19 (ref 12–20)
CALCIUM SERPL-MCNC: 9 MG/DL (ref 8.5–10.1)
CHLORIDE SERPL-SCNC: 108 MMOL/L (ref 100–111)
CO2 SERPL-SCNC: 30 MMOL/L (ref 21–32)
CREAT SERPL-MCNC: 0.67 MG/DL (ref 0.6–1.3)
DIFFERENTIAL METHOD BLD: ABNORMAL
EOSINOPHIL # BLD: 0.1 K/UL (ref 0–0.4)
EOSINOPHIL NFR BLD: 2 % (ref 0–5)
ERYTHROCYTE [DISTWIDTH] IN BLOOD BY AUTOMATED COUNT: 14 % (ref 11.6–14.5)
GLOBULIN SER CALC-MCNC: 4.2 G/DL (ref 2–4)
GLUCOSE SERPL-MCNC: 103 MG/DL (ref 74–99)
HCT VFR BLD AUTO: 39.6 % (ref 35–45)
HGB BLD-MCNC: 13.4 G/DL (ref 12–16)
IMM GRANULOCYTES # BLD AUTO: 0 K/UL (ref 0–0.04)
IMM GRANULOCYTES NFR BLD AUTO: 0 % (ref 0–0.5)
INR PPP: 1.1 (ref 0.9–1.1)
LYMPHOCYTES # BLD: 2 K/UL (ref 0.9–3.6)
LYMPHOCYTES NFR BLD: 39 % (ref 21–52)
MCH RBC QN AUTO: 32 PG (ref 24–34)
MCHC RBC AUTO-ENTMCNC: 33.8 G/DL (ref 31–37)
MCV RBC AUTO: 94.5 FL (ref 78–100)
MONOCYTES # BLD: 0.5 K/UL (ref 0.05–1.2)
MONOCYTES NFR BLD: 10 % (ref 3–10)
NEUTS SEG # BLD: 2.5 K/UL (ref 1.8–8)
NEUTS SEG NFR BLD: 49 % (ref 40–73)
NRBC # BLD: 0 K/UL (ref 0–0.01)
NRBC BLD-RTO: 0 PER 100 WBC
PLATELET # BLD AUTO: 84 K/UL (ref 135–420)
PMV BLD AUTO: 11.4 FL (ref 9.2–11.8)
POTASSIUM SERPL-SCNC: 3.2 MMOL/L (ref 3.5–5.5)
PROT SERPL-MCNC: 7.3 G/DL (ref 6.4–8.2)
PROTHROMBIN TIME: 13.8 SEC (ref 11.9–14.7)
RBC # BLD AUTO: 4.19 M/UL (ref 4.2–5.3)
SODIUM SERPL-SCNC: 142 MMOL/L (ref 136–145)
WBC # BLD AUTO: 5.1 K/UL (ref 4.6–13.2)

## 2024-02-22 PROCEDURE — 86038 ANTINUCLEAR ANTIBODIES: CPT

## 2024-02-22 PROCEDURE — 85025 COMPLETE CBC W/AUTO DIFF WBC: CPT

## 2024-02-22 PROCEDURE — 86381 MITOCHONDRIAL ANTIBODY EACH: CPT

## 2024-02-22 PROCEDURE — 36415 COLL VENOUS BLD VENIPUNCTURE: CPT

## 2024-02-22 PROCEDURE — 85610 PROTHROMBIN TIME: CPT

## 2024-02-22 PROCEDURE — 80076 HEPATIC FUNCTION PANEL: CPT

## 2024-02-22 PROCEDURE — 80048 BASIC METABOLIC PNL TOTAL CA: CPT

## 2024-02-22 PROCEDURE — 86037 ANCA TITER EACH ANTIBODY: CPT

## 2024-02-22 RX ORDER — LEVOTHYROXINE SODIUM 0.07 MG/1
75 TABLET ORAL DAILY
COMMUNITY
Start: 2024-02-06

## 2024-02-22 RX ORDER — SEMAGLUTIDE 2.68 MG/ML
INJECTION, SOLUTION SUBCUTANEOUS
COMMUNITY
Start: 2024-02-02

## 2024-02-22 NOTE — PROGRESS NOTES
Echogenic with nodular surface consistent with cirrhosis.  No liver mass lesions.  No dilated bile ducts.  No ascites.    11/2023.  Ultrasound of liver.  Echogenic consistent with chronic liver disease.  No liver mass lesions.  No dilated bile ducts.  No ascites.    OTHER TESTING:  Not available or performed    FOLLOW-UP:  All of the issues listed above in the Assessment and Plan were discussed with the patient.  All questions were answered.  The patient expressed a clear understanding of the above.    Follow-up Liver [facility] in 3 month follow up      DINORAH De Leon  [facility]  [address], [address]  [place]  [address] [phone]

## 2024-02-23 LAB — MITOCHONDRIA M2 IGG SER-ACNC: <20 UNITS (ref 0–20)

## 2024-02-26 LAB
ANA BY IFA: POSITIVE
Lab: ABNORMAL
SPECKLED PATTERN: ABNORMAL

## 2024-02-27 LAB
C-ANCA TITR SER IF: NORMAL TITER
P-ANCA ATYPICAL TITR SER IF: NORMAL TITER
P-ANCA TITR SER IF: NORMAL TITER

## 2024-05-16 ENCOUNTER — OFFICE VISIT (OUTPATIENT)
Age: 69
End: 2024-05-16
Payer: MEDICARE

## 2024-05-16 ENCOUNTER — HOSPITAL ENCOUNTER (OUTPATIENT)
Facility: HOSPITAL | Age: 69
Setting detail: SPECIMEN
Discharge: HOME | End: 2024-05-16
Payer: MEDICARE

## 2024-05-16 VITALS
WEIGHT: 223 LBS | SYSTOLIC BLOOD PRESSURE: 119 MMHG | DIASTOLIC BLOOD PRESSURE: 59 MMHG | HEART RATE: 90 BPM | TEMPERATURE: 97.1 F | OXYGEN SATURATION: 98 % | BODY MASS INDEX: 42.1 KG/M2 | HEIGHT: 61 IN

## 2024-05-16 DIAGNOSIS — K74.60 HEPATIC CIRRHOSIS, UNSPECIFIED HEPATIC CIRRHOSIS TYPE, UNSPECIFIED WHETHER ASCITES PRESENT (HCC): ICD-10-CM

## 2024-05-16 DIAGNOSIS — K74.60 HEPATIC CIRRHOSIS, UNSPECIFIED HEPATIC CIRRHOSIS TYPE, UNSPECIFIED WHETHER ASCITES PRESENT (HCC): Primary | ICD-10-CM

## 2024-05-16 LAB
ALBUMIN SERPL-MCNC: 3.2 G/DL (ref 3.4–5)
ALBUMIN/GLOB SERPL: 0.8 (ref 0.8–1.7)
ALP SERPL-CCNC: 137 U/L (ref 45–117)
ALT SERPL-CCNC: 73 U/L (ref 13–56)
ANION GAP SERPL CALC-SCNC: 8 MMOL/L (ref 3–18)
AST SERPL-CCNC: 82 U/L (ref 10–38)
BASOPHILS # BLD: 0 K/UL (ref 0–0.1)
BASOPHILS NFR BLD: 1 % (ref 0–2)
BILIRUB DIRECT SERPL-MCNC: 0.4 MG/DL (ref 0–0.2)
BILIRUB SERPL-MCNC: 1.1 MG/DL (ref 0.2–1)
BUN SERPL-MCNC: 15 MG/DL (ref 7–18)
BUN/CREAT SERPL: 20 (ref 12–20)
CALCIUM SERPL-MCNC: 9 MG/DL (ref 8.5–10.1)
CHLORIDE SERPL-SCNC: 106 MMOL/L (ref 100–111)
CO2 SERPL-SCNC: 26 MMOL/L (ref 21–32)
CREAT SERPL-MCNC: 0.75 MG/DL (ref 0.6–1.3)
DIFFERENTIAL METHOD BLD: ABNORMAL
EOSINOPHIL # BLD: 0.1 K/UL (ref 0–0.4)
EOSINOPHIL NFR BLD: 2 % (ref 0–5)
ERYTHROCYTE [DISTWIDTH] IN BLOOD BY AUTOMATED COUNT: 14 % (ref 11.6–14.5)
GLOBULIN SER CALC-MCNC: 4 G/DL (ref 2–4)
GLUCOSE SERPL-MCNC: 90 MG/DL (ref 74–99)
HCT VFR BLD AUTO: 39.3 % (ref 35–45)
HGB BLD-MCNC: 13.3 G/DL (ref 12–16)
IMM GRANULOCYTES # BLD AUTO: 0 K/UL (ref 0–0.04)
IMM GRANULOCYTES NFR BLD AUTO: 0 % (ref 0–0.5)
INR PPP: 1.1 (ref 0.9–1.1)
LYMPHOCYTES # BLD: 2 K/UL (ref 0.9–3.6)
LYMPHOCYTES NFR BLD: 39 % (ref 21–52)
MCH RBC QN AUTO: 32 PG (ref 24–34)
MCHC RBC AUTO-ENTMCNC: 33.8 G/DL (ref 31–37)
MCV RBC AUTO: 94.5 FL (ref 78–100)
MONOCYTES # BLD: 0.4 K/UL (ref 0.05–1.2)
MONOCYTES NFR BLD: 9 % (ref 3–10)
NEUTS SEG # BLD: 2.6 K/UL (ref 1.8–8)
NEUTS SEG NFR BLD: 50 % (ref 40–73)
NRBC # BLD: 0 K/UL (ref 0–0.01)
NRBC BLD-RTO: 0 PER 100 WBC
PLATELET # BLD AUTO: 82 K/UL (ref 135–420)
PMV BLD AUTO: 12 FL (ref 9.2–11.8)
POTASSIUM SERPL-SCNC: 3.2 MMOL/L (ref 3.5–5.5)
PROT SERPL-MCNC: 7.2 G/DL (ref 6.4–8.2)
PROTHROMBIN TIME: 14.4 SEC (ref 11.9–14.7)
RBC # BLD AUTO: 4.16 M/UL (ref 4.2–5.3)
SODIUM SERPL-SCNC: 140 MMOL/L (ref 136–145)
WBC # BLD AUTO: 5.1 K/UL (ref 4.6–13.2)

## 2024-05-16 PROCEDURE — 82107 ALPHA-FETOPROTEIN L3: CPT

## 2024-05-16 PROCEDURE — 86381 MITOCHONDRIAL ANTIBODY EACH: CPT

## 2024-05-16 PROCEDURE — 1036F TOBACCO NON-USER: CPT | Performed by: NURSE PRACTITIONER

## 2024-05-16 PROCEDURE — G8427 DOCREV CUR MEDS BY ELIG CLIN: HCPCS | Performed by: NURSE PRACTITIONER

## 2024-05-16 PROCEDURE — 3074F SYST BP LT 130 MM HG: CPT | Performed by: NURSE PRACTITIONER

## 2024-05-16 PROCEDURE — 85025 COMPLETE CBC W/AUTO DIFF WBC: CPT

## 2024-05-16 PROCEDURE — 85610 PROTHROMBIN TIME: CPT

## 2024-05-16 PROCEDURE — G8417 CALC BMI ABV UP PARAM F/U: HCPCS | Performed by: NURSE PRACTITIONER

## 2024-05-16 PROCEDURE — G8400 PT W/DXA NO RESULTS DOC: HCPCS | Performed by: NURSE PRACTITIONER

## 2024-05-16 PROCEDURE — 80076 HEPATIC FUNCTION PANEL: CPT

## 2024-05-16 PROCEDURE — 3078F DIAST BP <80 MM HG: CPT | Performed by: NURSE PRACTITIONER

## 2024-05-16 PROCEDURE — 80048 BASIC METABOLIC PNL TOTAL CA: CPT

## 2024-05-16 PROCEDURE — 99214 OFFICE O/P EST MOD 30 MIN: CPT | Performed by: NURSE PRACTITIONER

## 2024-05-16 PROCEDURE — 1090F PRES/ABSN URINE INCON ASSESS: CPT | Performed by: NURSE PRACTITIONER

## 2024-05-16 PROCEDURE — 36415 COLL VENOUS BLD VENIPUNCTURE: CPT

## 2024-05-16 PROCEDURE — 3017F COLORECTAL CA SCREEN DOC REV: CPT | Performed by: NURSE PRACTITIONER

## 2024-05-16 PROCEDURE — 1123F ACP DISCUSS/DSCN MKR DOCD: CPT | Performed by: NURSE PRACTITIONER

## 2024-05-16 RX ORDER — CYCLOBENZAPRINE HCL 10 MG
10 TABLET ORAL 3 TIMES DAILY PRN
COMMUNITY
Start: 2024-04-14

## 2024-05-16 RX ORDER — CEPHALEXIN 500 MG/1
CAPSULE ORAL
COMMUNITY
Start: 2024-05-15

## 2024-05-16 RX ORDER — HYDROCODONE BITARTRATE AND ACETAMINOPHEN 5; 325 MG/1; MG/1
TABLET ORAL
COMMUNITY
Start: 2024-04-14 | End: 2024-05-16

## 2024-05-16 NOTE — PROGRESS NOTES
[facility]      Esdras Lemon MD, FACP, FACG, FAASLD      CINTIA Powell-C    Radha Eid, [facility]-BC   Kellen Ratshanna, [facility]   Divina Taylor, FNP-C  Yuriy Larry, FNP-C   Jen Hinkle, [facility]   at [facility]   [address], [address]   [place]  [address] [phone]   FAX: [phone]  [facility]   [address], [address]   [place]  [address] [phone]   FAX: [phone]     Patient Care Team:  Lorenza Samaniego PA as PCP - General (Family Medicine)  Norris Lynn MD as Consulting Physician (Gynecological Oncology)  Eduardo Riley MD (Obstetrics & Gynecology)      Patient Active Problem List   Diagnosis    Obesity, morbid (HCC)    Elevated liver enzymes    Type II diabetes mellitus (HCC)    Hypercholesterolemia    GERD (gastroesophageal reflux disease)    Hypertension    S/P YUMIKO (total abdominal hysterectomy)    History of total bilateral knee replacement    H/O elbow surgery    History of cholecystectomy       Janet Chaves is being seen at [facility] for management of cirrhosis that appears to be secondary to Metabolic Associated SteatoHepatitis (MASH) formerly called MCNAMARA. The active problem list, all pertinent past medical history, medications, liver histology, endoscopic studies, radiologic findings, and laboratory findings related to the liver disorder were reviewed and discussed with the patient.      The patient is a 68 y.o. female who was found to have elevated liver transaminases in 2022.      Serologic evaluation for markers of chronic liver disease was positive for BERNARD    The most recent imaging of the liver was Ultrasound performed in 11/2023.  Results suggest cirrhosis, no liver mass lesion, no dilated bile ducts, no ascites.

## 2024-05-19 LAB — MITOCHONDRIA M2 IGG SER-ACNC: <20 UNITS (ref 0–20)

## 2024-05-20 LAB
AFP L3 MFR SERPL: 6.2 % (ref 0–9.9)
AFP SERPL-MCNC: 7.9 NG/ML (ref 0–9.2)

## 2024-11-13 RX ORDER — METOPROLOL SUCCINATE 25 MG/1
12.5 TABLET, EXTENDED RELEASE ORAL DAILY
COMMUNITY
Start: 2024-09-25 | End: 2024-11-14

## 2024-11-13 RX ORDER — ROSUVASTATIN CALCIUM 10 MG/1
10 TABLET, COATED ORAL DAILY
COMMUNITY
Start: 2024-09-19 | End: 2024-11-14

## 2024-11-14 ENCOUNTER — OFFICE VISIT (OUTPATIENT)
Age: 69
End: 2024-11-14
Payer: MEDICARE

## 2024-11-14 VITALS
HEART RATE: 60 BPM | SYSTOLIC BLOOD PRESSURE: 119 MMHG | OXYGEN SATURATION: 99 % | TEMPERATURE: 97.4 F | DIASTOLIC BLOOD PRESSURE: 51 MMHG | WEIGHT: 231 LBS | HEIGHT: 61 IN | BODY MASS INDEX: 43.61 KG/M2

## 2024-11-14 DIAGNOSIS — K75.81 METABOLIC DYSFUNCTION-ASSOCIATED STEATOHEPATITIS (MASH): ICD-10-CM

## 2024-11-14 DIAGNOSIS — K74.69 OTHER CIRRHOSIS OF LIVER (HCC): Primary | ICD-10-CM

## 2024-11-14 PROCEDURE — 1159F MED LIST DOCD IN RCRD: CPT | Performed by: NURSE PRACTITIONER

## 2024-11-14 PROCEDURE — G8400 PT W/DXA NO RESULTS DOC: HCPCS | Performed by: NURSE PRACTITIONER

## 2024-11-14 PROCEDURE — 3017F COLORECTAL CA SCREEN DOC REV: CPT | Performed by: NURSE PRACTITIONER

## 2024-11-14 PROCEDURE — G8484 FLU IMMUNIZE NO ADMIN: HCPCS | Performed by: NURSE PRACTITIONER

## 2024-11-14 PROCEDURE — 1126F AMNT PAIN NOTED NONE PRSNT: CPT | Performed by: NURSE PRACTITIONER

## 2024-11-14 PROCEDURE — 1090F PRES/ABSN URINE INCON ASSESS: CPT | Performed by: NURSE PRACTITIONER

## 2024-11-14 PROCEDURE — 99214 OFFICE O/P EST MOD 30 MIN: CPT | Performed by: NURSE PRACTITIONER

## 2024-11-14 PROCEDURE — 3074F SYST BP LT 130 MM HG: CPT | Performed by: NURSE PRACTITIONER

## 2024-11-14 PROCEDURE — 1036F TOBACCO NON-USER: CPT | Performed by: NURSE PRACTITIONER

## 2024-11-14 PROCEDURE — G8427 DOCREV CUR MEDS BY ELIG CLIN: HCPCS | Performed by: NURSE PRACTITIONER

## 2024-11-14 PROCEDURE — 1160F RVW MEDS BY RX/DR IN RCRD: CPT | Performed by: NURSE PRACTITIONER

## 2024-11-14 PROCEDURE — G8417 CALC BMI ABV UP PARAM F/U: HCPCS | Performed by: NURSE PRACTITIONER

## 2024-11-14 PROCEDURE — 3078F DIAST BP <80 MM HG: CPT | Performed by: NURSE PRACTITIONER

## 2024-11-14 PROCEDURE — 1123F ACP DISCUSS/DSCN MKR DOCD: CPT | Performed by: NURSE PRACTITIONER

## 2024-11-14 RX ORDER — CEFDINIR 300 MG/1
CAPSULE ORAL
COMMUNITY
Start: 2024-08-13

## 2024-11-14 NOTE — PROGRESS NOTES
need a liver biopsy to determine if there is an underlying autoimmune liver disease in addition to MASLD.    The positive autoimmune marker has no clinical significance at this time.    Thrombocytopenia   This is secondary to cirrhosis.  There is no evidence of overt bleeding.    No treatment is required.    The platelet count is adequate for the patient to undergo procedures without the need for platelet transfusion or platelet growth factors.    Screening for Hepatocellular Carcinoma  HCC screening was performed in 5/2024 and does not suggest HCC.      AFP performed in 11/2023 was within normal range.   Will repeat ultrasound in 6 months.    Treatment of other medical problems in patients with chronic liver disease  There are no contraindications for the patient to take most medications that are necessary for treatment of other medical issues.    Counseling for alcohol in patients with chronic liver disease  The patient does not consume any significant amount of alcohol.    Vaccinations   Vaccination for viral hepatitis A and B is recommended since the patient has no serologic evidence of previous exposure or vaccination with immunity.    Routine vaccinations against other bacterial and viral agents can be performed as indicated.  Annual flu vaccination should be administered if indicated.    ALLERGIES  Allergies   Allergen Reactions    Sulfa Antibiotics Hives     MEDICATIONS  Current Outpatient Medications   Medication Instructions    blood glucose test strips (ACCU-CHEK ANNAD PLUS) strip 1 each, Other, 2 TIMES DAILY    cefdinir (OMNICEF) 300 MG capsule TAKE 1 CAPSULE (300 MG TOTAL) BY MOUTH TWO TIMES A DAY FOR 7 DAYS.    levothyroxine (SYNTHROID) 75 mcg, Oral, DAILY    Magnesium Oxide 400 MG CAPS 1 capsule, Oral, DAILY    OZEMPIC, 2 MG/DOSE, 8 MG/3ML SOPN sc injection INJECT 2 MG SUBCUTANEOUSLY ONE TIME PER WEEK    triamcinolone (KENALOG) 0.025 % ointment Apply topically twice daily as needed for rash.

## 2024-11-16 ENCOUNTER — HOSPITAL ENCOUNTER (OUTPATIENT)
Facility: HOSPITAL | Age: 69
Discharge: HOME | End: 2024-11-19
Payer: MEDICARE

## 2024-11-16 DIAGNOSIS — K74.60 HEPATIC CIRRHOSIS, UNSPECIFIED HEPATIC CIRRHOSIS TYPE, UNSPECIFIED WHETHER ASCITES PRESENT (HCC): ICD-10-CM

## 2024-11-16 PROCEDURE — 76705 ECHO EXAM OF ABDOMEN: CPT

## 2025-05-14 ENCOUNTER — HOSPITAL ENCOUNTER (OUTPATIENT)
Facility: HOSPITAL | Age: 70
Discharge: HOME | End: 2025-05-17
Payer: MEDICARE

## 2025-05-14 DIAGNOSIS — K74.69 OTHER CIRRHOSIS OF LIVER (HCC): ICD-10-CM

## 2025-05-14 PROCEDURE — 76705 ECHO EXAM OF ABDOMEN: CPT

## 2025-05-19 ENCOUNTER — OFFICE VISIT (OUTPATIENT)
Age: 70
End: 2025-05-19
Payer: MEDICARE

## 2025-05-19 ENCOUNTER — RESULTS FOLLOW-UP (OUTPATIENT)
Age: 70
End: 2025-05-19

## 2025-05-19 ENCOUNTER — HOSPITAL ENCOUNTER (OUTPATIENT)
Facility: HOSPITAL | Age: 70
Setting detail: SPECIMEN
Discharge: HOME | End: 2025-05-22
Payer: MEDICARE

## 2025-05-19 VITALS
BODY MASS INDEX: 42.03 KG/M2 | HEIGHT: 61 IN | WEIGHT: 222.6 LBS | HEART RATE: 65 BPM | OXYGEN SATURATION: 100 % | TEMPERATURE: 97.6 F | DIASTOLIC BLOOD PRESSURE: 58 MMHG | SYSTOLIC BLOOD PRESSURE: 128 MMHG

## 2025-05-19 DIAGNOSIS — K74.69 OTHER CIRRHOSIS OF LIVER (HCC): ICD-10-CM

## 2025-05-19 DIAGNOSIS — K74.69 OTHER CIRRHOSIS OF LIVER (HCC): Primary | ICD-10-CM

## 2025-05-19 LAB
ALBUMIN SERPL-MCNC: 3.2 G/DL (ref 3.4–5)
ALBUMIN/GLOB SERPL: 0.9 (ref 0.8–1.7)
ALP SERPL-CCNC: 95 U/L (ref 45–117)
ALT SERPL-CCNC: 33 U/L (ref 10–35)
ANION GAP SERPL CALC-SCNC: 9 MMOL/L (ref 3–18)
AST SERPL-CCNC: 48 U/L (ref 10–38)
BASOPHILS # BLD: 0.02 K/UL (ref 0–0.1)
BASOPHILS NFR BLD: 0.5 % (ref 0–2)
BILIRUB DIRECT SERPL-MCNC: 0.3 MG/DL (ref 0–0.2)
BILIRUB SERPL-MCNC: 0.7 MG/DL (ref 0.2–1)
BUN SERPL-MCNC: 14 MG/DL (ref 6–23)
BUN/CREAT SERPL: 18 (ref 12–20)
CALCIUM SERPL-MCNC: 9.5 MG/DL (ref 8.5–10.1)
CHLORIDE SERPL-SCNC: 106 MMOL/L (ref 98–107)
CO2 SERPL-SCNC: 27 MMOL/L (ref 21–32)
CREAT SERPL-MCNC: 0.77 MG/DL (ref 0.6–1.3)
DIFFERENTIAL METHOD BLD: ABNORMAL
EOSINOPHIL # BLD: 0.1 K/UL (ref 0–0.4)
EOSINOPHIL NFR BLD: 2.3 % (ref 0–5)
ERYTHROCYTE [DISTWIDTH] IN BLOOD BY AUTOMATED COUNT: 13.4 % (ref 11.6–14.5)
GLOBULIN SER CALC-MCNC: 3.7 G/DL (ref 2–4)
GLUCOSE SERPL-MCNC: 108 MG/DL (ref 74–108)
HCT VFR BLD AUTO: 38.9 % (ref 35–45)
HGB BLD-MCNC: 13 G/DL (ref 12–16)
IMM GRANULOCYTES # BLD AUTO: 0.01 K/UL (ref 0–0.04)
IMM GRANULOCYTES NFR BLD AUTO: 0.2 % (ref 0–0.5)
LYMPHOCYTES # BLD: 1.53 K/UL (ref 0.9–3.3)
LYMPHOCYTES NFR BLD: 35.5 % (ref 21–52)
MCH RBC QN AUTO: 31.9 PG (ref 24–34)
MCHC RBC AUTO-ENTMCNC: 33.4 G/DL (ref 31–37)
MCV RBC AUTO: 95.6 FL (ref 78–100)
MONOCYTES # BLD: 0.45 K/UL (ref 0.05–1.2)
MONOCYTES NFR BLD: 10.5 % (ref 3–10)
NEUTS SEG # BLD: 2.19 K/UL (ref 1.8–8)
NEUTS SEG NFR BLD: 51 % (ref 40–73)
NRBC # BLD: 0 K/UL (ref 0–0.01)
NRBC BLD-RTO: 0 PER 100 WBC
PLATELET # BLD AUTO: 71 K/UL (ref 135–420)
PLATELET COMMENT: ABNORMAL
PMV BLD AUTO: 11.2 FL (ref 9.2–11.8)
POTASSIUM SERPL-SCNC: 4 MMOL/L (ref 3.5–5.5)
PROT SERPL-MCNC: 6.9 G/DL (ref 6.4–8.2)
RBC # BLD AUTO: 4.07 M/UL (ref 4.2–5.3)
RBC MORPH BLD: ABNORMAL
SODIUM SERPL-SCNC: 141 MMOL/L (ref 136–145)
WBC # BLD AUTO: 4.3 K/UL (ref 4.6–13.2)

## 2025-05-19 PROCEDURE — 91200 LIVER ELASTOGRAPHY: CPT | Performed by: NURSE PRACTITIONER

## 2025-05-19 PROCEDURE — 80048 BASIC METABOLIC PNL TOTAL CA: CPT

## 2025-05-19 PROCEDURE — 36415 COLL VENOUS BLD VENIPUNCTURE: CPT

## 2025-05-19 PROCEDURE — 80076 HEPATIC FUNCTION PANEL: CPT

## 2025-05-19 PROCEDURE — 85025 COMPLETE CBC W/AUTO DIFF WBC: CPT

## 2025-05-19 PROCEDURE — 82107 ALPHA-FETOPROTEIN L3: CPT

## 2025-05-19 RX ORDER — BENZONATATE 100 MG/1
100 CAPSULE ORAL 3 TIMES DAILY PRN
COMMUNITY
Start: 2024-12-24

## 2025-05-19 RX ORDER — FLUTICASONE PROPIONATE 50 MCG
2 SPRAY, SUSPENSION (ML) NASAL DAILY
COMMUNITY
Start: 2025-01-21 | End: 2025-05-21

## 2025-05-19 RX ORDER — ALBUTEROL SULFATE 90 UG/1
2 INHALANT RESPIRATORY (INHALATION) EVERY 4 HOURS PRN
COMMUNITY
Start: 2024-12-24

## 2025-05-19 RX ORDER — ROSUVASTATIN CALCIUM 10 MG/1
10 TABLET, COATED ORAL DAILY
COMMUNITY
Start: 2025-02-24

## 2025-05-19 RX ORDER — POTASSIUM CHLORIDE 750 MG/1
10 TABLET, EXTENDED RELEASE ORAL 2 TIMES DAILY
COMMUNITY
Start: 2025-01-09 | End: 2025-05-21

## 2025-05-19 RX ORDER — POLYETHYLENE GLYCOL-3350 AND ELECTROLYTES WITH FLAVOR PACK 240; 5.84; 2.98; 6.72; 22.72 G/278.26G; G/278.26G; G/278.26G; G/278.26G; G/278.26G
POWDER, FOR SOLUTION ORAL
COMMUNITY
Start: 2024-12-11 | End: 2025-05-21

## 2025-05-19 NOTE — PROGRESS NOTES
[facility]      Esdras Lemon MD, FACP, FACG, FAASLD      CINTIA Powell-C    Radha Eid, [facility]-BC   Kellen Ratshanna, [facility]   Divina Taylor, FNP-C  Yuriy Larry, FNP-C   Jen Hinkle, [facility]   at [facility]   [address], [address]   [place]  [address] [phone]   FAX: [phone]  [facility]   [address], [address]   [place]  [address] [phone]   FAX: [phone]     Patient Care Team:  Lorenza Samaniego PA as PCP - General (Family Medicine)  Eduardo Riley MD (Obstetrics & Gynecology)      Patient Active Problem List   Diagnosis    Other cirrhosis of liver (HCC)    Metabolic dysfunction-associated steatohepatitis (MASH)     Janet Chaves is being seen at [facility] for management of cirrhosis that appears to be secondary to Metabolic Associated SteatoHepatitis (MASH) formerly called MCNAMARA. The active problem list, all pertinent past medical history, medications, liver histology, endoscopic studies, radiologic findings, and laboratory findings related to the liver disorder were reviewed and discussed with the patient.      The patient is a 69 y.o. female who was found to have elevated liver transaminases in 2022.      Serologic evaluation for markers of chronic liver disease was positive for BERNARD    The most recent imaging of the liver was CT performed in 5/2024. Results suggest cirrhosis.    No liver mass lesions noted.    Assessment of liver fibrosis with Fibroscan was performed in 2/2024. The result was 20.5 kPa which correlates with cirrhosis. The CAP score of 193 does not suggest hepatic steatosis.    Assessment of liver fibrosis with Fibroscan was performed in the office today. The result was 18.4 kPa which correlates with cirrhosis. The CAP score of

## 2025-06-01 LAB
AFP L3 MFR SERPL: 5.7 % (ref 0–9.9)
AFP SERPL-MCNC: 6.6 NG/ML (ref 0–9.2)